# Patient Record
Sex: FEMALE | Race: WHITE | NOT HISPANIC OR LATINO | Employment: OTHER | URBAN - METROPOLITAN AREA
[De-identification: names, ages, dates, MRNs, and addresses within clinical notes are randomized per-mention and may not be internally consistent; named-entity substitution may affect disease eponyms.]

---

## 2017-01-03 ENCOUNTER — TELEPHONE (OUTPATIENT)
Dept: INTERNAL MEDICINE | Facility: CLINIC | Age: 77
End: 2017-01-03

## 2017-01-03 ENCOUNTER — RADIANT APPOINTMENT (OUTPATIENT)
Dept: MAMMOGRAPHY | Facility: CLINIC | Age: 77
End: 2017-01-03
Attending: INTERNAL MEDICINE
Payer: COMMERCIAL

## 2017-01-03 DIAGNOSIS — I10 ESSENTIAL HYPERTENSION WITH GOAL BLOOD PRESSURE LESS THAN 140/90: ICD-10-CM

## 2017-01-03 DIAGNOSIS — Z12.31 VISIT FOR SCREENING MAMMOGRAM: ICD-10-CM

## 2017-01-03 DIAGNOSIS — E78.5 HYPERLIPIDEMIA WITH TARGET LDL LESS THAN 100: ICD-10-CM

## 2017-01-03 LAB
ALBUMIN SERPL-MCNC: 3.5 G/DL (ref 3.4–5)
ALP SERPL-CCNC: 131 U/L (ref 40–150)
ALT SERPL W P-5'-P-CCNC: 31 U/L (ref 0–50)
ANION GAP SERPL CALCULATED.3IONS-SCNC: 8 MMOL/L (ref 3–14)
AST SERPL W P-5'-P-CCNC: 24 U/L (ref 0–45)
BILIRUB SERPL-MCNC: 0.6 MG/DL (ref 0.2–1.3)
BUN SERPL-MCNC: 16 MG/DL (ref 7–30)
CALCIUM SERPL-MCNC: 9.6 MG/DL (ref 8.5–10.1)
CHLORIDE SERPL-SCNC: 107 MMOL/L (ref 94–109)
CHOLEST SERPL-MCNC: 145 MG/DL
CO2 SERPL-SCNC: 28 MMOL/L (ref 20–32)
CREAT SERPL-MCNC: 0.98 MG/DL (ref 0.52–1.04)
GFR SERPL CREATININE-BSD FRML MDRD: 55 ML/MIN/1.7M2
GLUCOSE SERPL-MCNC: 94 MG/DL (ref 70–99)
HDLC SERPL-MCNC: 43 MG/DL
LDLC SERPL CALC-MCNC: 67 MG/DL
NONHDLC SERPL-MCNC: 102 MG/DL
POTASSIUM SERPL-SCNC: 3.6 MMOL/L (ref 3.4–5.3)
PROT SERPL-MCNC: 7 G/DL (ref 6.8–8.8)
SODIUM SERPL-SCNC: 143 MMOL/L (ref 133–144)
TRIGL SERPL-MCNC: 173 MG/DL

## 2017-01-03 PROCEDURE — G0202 SCR MAMMO BI INCL CAD: HCPCS | Mod: TC

## 2017-01-03 PROCEDURE — 80061 LIPID PANEL: CPT | Performed by: INTERNAL MEDICINE

## 2017-01-03 PROCEDURE — 36415 COLL VENOUS BLD VENIPUNCTURE: CPT | Performed by: INTERNAL MEDICINE

## 2017-01-03 PROCEDURE — 80053 COMPREHEN METABOLIC PANEL: CPT | Performed by: INTERNAL MEDICINE

## 2017-01-06 ENCOUNTER — HOSPITAL ENCOUNTER (OUTPATIENT)
Dept: MAMMOGRAPHY | Facility: CLINIC | Age: 77
End: 2017-01-06
Attending: INTERNAL MEDICINE
Payer: MEDICARE

## 2017-01-06 ENCOUNTER — HOSPITAL ENCOUNTER (OUTPATIENT)
Dept: MAMMOGRAPHY | Facility: CLINIC | Age: 77
Discharge: HOME | End: 2017-01-06
Attending: INTERNAL MEDICINE | Admitting: INTERNAL MEDICINE
Payer: MEDICARE

## 2017-01-06 DIAGNOSIS — R92.8 ABNORMAL MAMMOGRAM: ICD-10-CM

## 2017-01-06 PROCEDURE — G0206 DX MAMMO INCL CAD UNI: HCPCS | Mod: RT

## 2017-01-06 PROCEDURE — 76642 ULTRASOUND BREAST LIMITED: CPT | Mod: RT

## 2017-01-12 ENCOUNTER — TELEPHONE (OUTPATIENT)
Dept: INTERNAL MEDICINE | Facility: CLINIC | Age: 77
End: 2017-01-12

## 2017-01-12 NOTE — TELEPHONE ENCOUNTER
Pt will make her own appt for a biopsy to be done.  She is out of town for a while. Wanted clinic to know not to schedule for her.  Pt had a mammo and it was discussed she needs a biopsy and there isn't a big hurry to get it done.

## 2017-02-02 DIAGNOSIS — E78.5 HYPERLIPIDEMIA WITH TARGET LDL LESS THAN 100: Primary | ICD-10-CM

## 2017-02-02 RX ORDER — SIMVASTATIN 40 MG
40 TABLET ORAL AT BEDTIME
Qty: 90 TABLET | Refills: 2 | Status: SHIPPED | OUTPATIENT
Start: 2017-02-02 | End: 2017-12-27

## 2017-02-02 NOTE — TELEPHONE ENCOUNTER
simvastatin     Last Written Prescription Date: 01/05/16  Last Fill Quantity: 90, # refills: 3  Last Office Visit with [facility], [facility] or [facility] prescribing provider: 11/08/16       CHOL      145   1/3/2017  HDL       43   1/3/2017  LDL       67   1/3/2017  TRIG      173   1/3/2017  CHOLHDLRATIO      3.9   11/3/2014

## 2017-03-06 ENCOUNTER — TELEPHONE (OUTPATIENT)
Dept: FAMILY MEDICINE | Facility: CLINIC | Age: 77
End: 2017-03-06

## 2017-03-06 NOTE — TELEPHONE ENCOUNTER
I would suggest highly to patient that she get this biopsied for reassurance.        IMPRESSION: BI-RADS CATEGORY: 4 - Suspicious Abnormality-Biopsy Should  Be Considered. Right breast 10:30 o'clock calcifications.     RECOMMENDED FOLLOW-UP: Biopsy.  Histology using stereotactically guided core needle biopsy with clip  placement is recommended.

## 2017-03-06 NOTE — TELEPHONE ENCOUNTER
Note received from [facility] stating they have mailed the patient 2 letters to schedule breast biopsy and nothing has been scheduled. Patient contacted and patient states he is declining breast biopsy at this point as she was told by radiologist abnormalities were most likely calcium deposits in breast. States she would like to follow up in 1 yr for recheck.

## 2017-12-27 DIAGNOSIS — E78.5 HYPERLIPIDEMIA WITH TARGET LDL LESS THAN 100: ICD-10-CM

## 2017-12-27 DIAGNOSIS — I10 ESSENTIAL HYPERTENSION WITH GOAL BLOOD PRESSURE LESS THAN 140/90: ICD-10-CM

## 2017-12-27 RX ORDER — AMLODIPINE BESYLATE 10 MG/1
10 TABLET ORAL DAILY
Qty: 30 TABLET | Refills: 0 | Status: SHIPPED | OUTPATIENT
Start: 2017-12-27 | End: 2018-01-05

## 2017-12-27 RX ORDER — SIMVASTATIN 40 MG
40 TABLET ORAL AT BEDTIME
Qty: 30 TABLET | Refills: 0 | Status: SHIPPED | OUTPATIENT
Start: 2017-12-27 | End: 2018-01-05

## 2017-12-27 RX ORDER — SIMVASTATIN 40 MG
TABLET ORAL
Qty: 90 TABLET | Refills: 0 | OUTPATIENT
Start: 2017-12-27

## 2017-12-27 NOTE — LETTER
[facility]  [address] [id number] [phone]            Alison Dash  [address] [facility] [address]        December 27, 2017    Dear Alison,    While refilling your prescription today, we noticed that you are due for an appointment with your provider.  We will refill your prescription for 30 days, but a follow-up appointment must be made before any additional refills can be approved.     Taking care of your health is important to us and we look forward to seeing you in the near future.  Please call us at [phone] or [phone] (or use [employer]) to schedule an appointment.     Please disregard this notice if you have already made an appointment.    Sincerely,        [facility]

## 2017-12-27 NOTE — TELEPHONE ENCOUNTER
Medication is being filled for 1 time refill only due to:  Patient needs to be seen because it has been more than one year since last visit.   Letter sent

## 2018-01-05 NOTE — PROGRESS NOTES
SUBJECTIVE:   Alison Dash is a 77 year old female who presents to clinic today for the following health issues:    Hyperlipidemia Follow-Up      Rate your low fat/cholesterol diet?: fair    Taking statin?  Yes, no muscle aches from statin    Other lipid medications/supplements?:  Fish oil/Omega 3, dose 2000 mg without side effects    Hypertension Follow-up      Outpatient blood pressures are not being checked.    Low Salt Diet: no added salt      Amount of exercise or physical activity: None- [place] around [place]     Problems taking medications regularly: No    Medication side effects: none  Diet: regular (no restrictions)    Other concerns:  1. Holding Zantac with good results     Problem list and histories reviewed & adjusted, as indicated.  Additional history: as documented    Patient Active Problem List   Diagnosis     Esophageal reflux     Tobacco use disorder     Low back pain     Hyperlipidemia with target LDL less than 100     CKD (chronic kidney disease) stage 3, GFR 30-59 ml/min     Advanced directives, counseling/discussion     Colonoscopy refused     Essential hypertension with goal blood pressure less than 140/90     Past Surgical History:   Procedure Laterality Date     NO HISTORY OF SURGERY         Social History   Substance Use Topics     Smoking status: Former Smoker     Packs/day: 0.50     Quit date: 4/1/2010     Smokeless tobacco: Never Used     Alcohol use Yes      Comment: SOCIAL     Family History   Problem Relation Age of Onset     C.A.D. Mother          Current Outpatient Prescriptions   Medication Sig Dispense Refill     amLODIPine (NORVASC) 10 MG tablet Take 1 tablet (10 mg) by mouth daily 30 tablet 0     simvastatin (ZOCOR) 40 MG tablet Take 1 tablet (40 mg) by mouth At Bedtime 30 tablet 0     aspirin 325 MG EC tablet Take 1 tablet by mouth daily.       Multiple Minerals-Vitamins (CALCIUM CITRATE +) TABS Take  by mouth. 630 mg daily       ARB NOT PRESCRIBED,  "INTENTIONAL, by Other route continuous prn.  0     Multiple Vitamins-Minerals (PRESERVISION/LUTEIN) CAPS Take 1 Can by mouth 2 times daily.       fish oil-omega-3 fatty acids 1000 MG capsule Take 2 capsules by mouth daily. 180 capsule 12     ZANTAC 75 75 MG OR TABS 1 QD       Allergies   Allergen Reactions     Lisinopril      Cough       BP Readings from Last 3 Encounters:   01/08/18 128/78   11/08/16 122/74   01/05/16 124/68    Wt Readings from Last 3 Encounters:   01/08/18 172 lb 8 oz (78.2 kg)   11/08/16 169 lb 14.4 oz (77.1 kg)   01/05/16 164 lb 8 oz (74.6 kg)            Reviewed and updated as needed this visit by clinical staff     Reviewed and updated as needed this visit by Provider         ROS:  C: NEGATIVE for fever, chills, change in weight  E/M: NEGATIVE for ear, mouth and throat problems  R: NEGATIVE for significant cough or SOB  CV: NEGATIVE for chest pain, palpitations or peripheral edema  GI: NEGATIVE for nausea, abdominal pain, heartburn, or change in bowel habits  : NEGATIVE for frequency, dysuria, or hematuria  M: NEGATIVE for significant arthralgias or myalgia  H: NEGATIVE for bleeding problems  P: NEGATIVE for changes in mood or affect    OBJECTIVE:                                                    /78  Pulse 87  Temp 98  F (36.7  C) (Oral)  Ht 5' 4\" (1.626 m)  Wt 172 lb 8 oz (78.2 kg)  SpO2 97%  BMI 29.61 kg/m2  Body mass index is 29.61 kg/(m^2).  GENERAL: healthy, alert and no distress  EYES: Eyes grossly normal to inspection, extraocular movements - intact, and PERRL  HENT: ear canals- normal; TMs- normal; Nose- normal; Mouth- no ulcers, no lesions  NECK: no tenderness, no adenopathy, no asymmetry, no masses, no stiffness; thyroid- normal to palpation  RESP: lungs clear to auscultation - no rales, no rhonchi, no wheezes  CV: regular rates and rhythm, normal S1 S2, no S3 or S4 and no murmur, no click or rub -  ABDOMEN: soft, no tenderness, no  hepatosplenomegaly, no masses, " normal bowel sounds  MS: extremities- no gross deformities noted, no edema  NEURO: strength and tone- normal, sensory exam- grossly normal, mentation- intact, speech- normal, reflexes- symmetric  PSYCH: Alert and oriented times 3; speech- coherent , normal rate and volume; able to articulate logical thoughts, able to abstract reason, no tangential thoughts, no hallucinations or delusions, affect- normal       ASSESSMENT/PLAN:                                                    (I10) Essential hypertension with goal blood pressure less than 140/90  (primary encounter diagnosis)  Comment: Able on current medical therapy we will make no changes in her current dosing  Plan: amLODIPine (NORVASC) 10 MG tablet,         Comprehensive metabolic panel        Blood pressure check is been recommended again in 6 months    (E78.5) Hyperlipidemia with target LDL less than 100  Comment: Repeat fasting labs have been ordered refills have been sent to her pharmacy  Plan: simvastatin (ZOCOR) 40 MG tablet, Comprehensive        metabolic panel, Lipid Profile          (N64.59) Abnormal breast finding  Comment: I had an extensive discussion with the patient in regards to her diagnostic mammogram and ultrasound that were performed in January of last year.  She informs me that she was told that there was nothing of significance.  Upon review of her ultrasound it reports a category 4 suspicious abnormality to the point of where a biopsy was recommended and scheduled on 1/12/2017 but the patient did not follow through.  Upon discussion it is noted that the patient was aware that a biopsy was recommended.  Discussed with patient the importance of following through with this based on the potential risk for breast malignancy but she is not interested in pursuing this.  I tried do understand her thought process as to why she is not interested in pursuing this and she feels that this is not significantly different from prior issues she has had and  does not want to pursue this at the present time.  Discussed with her my reservations about this and again suggested that she get this evaluated.  She will consider  Plan:     (N18.3) CKD (chronic kidney disease) stage 3, GFR 30-59 ml/min  Comment: Repeat complete metabolic panel as ordered.  Plan:     (F17.200) Tobacco use disorder  Comment: Continued smoking cessation was advised  Plan:     (Z71.89) Advanced directives, counseling/discussion  Comment:   Plan:     Advised patient that she should also obtain a colonoscopy as routine screening but she is also refused this    See Patient Instructions    Vince Beckett MD  [facility]    25 minutes spent with this patient, face to face, discussing treatment options for listed problems above as well as side effects of appropriate medications.  Counseling time extended beyond 50% of the clinic visit.  Medication dosing, treatment plan and follow-up were discussed. Also reviewed need for primary care testing for patient.

## 2018-01-08 ENCOUNTER — OFFICE VISIT (OUTPATIENT)
Dept: INTERNAL MEDICINE | Facility: CLINIC | Age: 78
End: 2018-01-08
Payer: COMMERCIAL

## 2018-01-08 VITALS
SYSTOLIC BLOOD PRESSURE: 128 MMHG | TEMPERATURE: 98 F | DIASTOLIC BLOOD PRESSURE: 78 MMHG | HEIGHT: 64 IN | BODY MASS INDEX: 29.45 KG/M2 | OXYGEN SATURATION: 97 % | WEIGHT: 172.5 LBS | HEART RATE: 87 BPM

## 2018-01-08 DIAGNOSIS — F17.200 TOBACCO USE DISORDER: ICD-10-CM

## 2018-01-08 DIAGNOSIS — E78.5 HYPERLIPIDEMIA WITH TARGET LDL LESS THAN 100: ICD-10-CM

## 2018-01-08 DIAGNOSIS — I10 ESSENTIAL HYPERTENSION WITH GOAL BLOOD PRESSURE LESS THAN 140/90: Primary | ICD-10-CM

## 2018-01-08 DIAGNOSIS — N64.59 ABNORMAL BREAST FINDING: ICD-10-CM

## 2018-01-08 DIAGNOSIS — K21.9 GASTROESOPHAGEAL REFLUX DISEASE WITHOUT ESOPHAGITIS: ICD-10-CM

## 2018-01-08 DIAGNOSIS — N18.30 CKD (CHRONIC KIDNEY DISEASE) STAGE 3, GFR 30-59 ML/MIN (H): ICD-10-CM

## 2018-01-08 DIAGNOSIS — Z71.89 ADVANCED DIRECTIVES, COUNSELING/DISCUSSION: ICD-10-CM

## 2018-01-08 LAB
ALBUMIN SERPL-MCNC: 3.3 G/DL (ref 3.4–5)
ALP SERPL-CCNC: 125 U/L (ref 40–150)
ALT SERPL W P-5'-P-CCNC: 22 U/L (ref 0–50)
ANION GAP SERPL CALCULATED.3IONS-SCNC: 7 MMOL/L (ref 3–14)
AST SERPL W P-5'-P-CCNC: 18 U/L (ref 0–45)
BILIRUB SERPL-MCNC: 0.6 MG/DL (ref 0.2–1.3)
BUN SERPL-MCNC: 20 MG/DL (ref 7–30)
CALCIUM SERPL-MCNC: 9.2 MG/DL (ref 8.5–10.1)
CHLORIDE SERPL-SCNC: 109 MMOL/L (ref 94–109)
CHOLEST SERPL-MCNC: 141 MG/DL
CO2 SERPL-SCNC: 26 MMOL/L (ref 20–32)
CREAT SERPL-MCNC: 1.01 MG/DL (ref 0.52–1.04)
GFR SERPL CREATININE-BSD FRML MDRD: 53 ML/MIN/1.7M2
GLUCOSE SERPL-MCNC: 102 MG/DL (ref 70–99)
HDLC SERPL-MCNC: 42 MG/DL
LDLC SERPL CALC-MCNC: 66 MG/DL
NONHDLC SERPL-MCNC: 99 MG/DL
POTASSIUM SERPL-SCNC: 4 MMOL/L (ref 3.4–5.3)
PROT SERPL-MCNC: 6.7 G/DL (ref 6.8–8.8)
SODIUM SERPL-SCNC: 142 MMOL/L (ref 133–144)
TRIGL SERPL-MCNC: 163 MG/DL

## 2018-01-08 PROCEDURE — 80053 COMPREHEN METABOLIC PANEL: CPT | Performed by: INTERNAL MEDICINE

## 2018-01-08 PROCEDURE — 99214 OFFICE O/P EST MOD 30 MIN: CPT | Performed by: INTERNAL MEDICINE

## 2018-01-08 PROCEDURE — 80061 LIPID PANEL: CPT | Performed by: INTERNAL MEDICINE

## 2018-01-08 PROCEDURE — 36415 COLL VENOUS BLD VENIPUNCTURE: CPT | Performed by: INTERNAL MEDICINE

## 2018-01-08 RX ORDER — SIMVASTATIN 40 MG
40 TABLET ORAL AT BEDTIME
Qty: 90 TABLET | Refills: 3 | Status: SHIPPED | OUTPATIENT
Start: 2018-01-08 | End: 2019-01-17

## 2018-01-08 RX ORDER — AMLODIPINE BESYLATE 10 MG/1
10 TABLET ORAL DAILY
Qty: 90 TABLET | Refills: 3 | Status: SHIPPED | OUTPATIENT
Start: 2018-01-08 | End: 2019-01-17

## 2018-01-08 NOTE — NURSING NOTE
"Chief Complaint   Patient presents with     Recheck Medication       Initial /78  Pulse 87  Temp 98  F (36.7  C) (Oral)  Ht 5' 4\" (1.626 m)  Wt 172 lb 8 oz (78.2 kg)  SpO2 97%  BMI 29.61 kg/m2 Estimated body mass index is 29.61 kg/(m^2) as calculated from the following:    Height as of this encounter: 5' 4\" (1.626 m).    Weight as of this encounter: 172 lb 8 oz (78.2 kg).  Medication Reconciliation: complete   Teresa Gill, NEENA      "

## 2018-01-08 NOTE — LETTER
[facility]  [address] [address]  [phone]      1/8/2018       Alison Dash  [address] [facility] [address]        Dear Alison,    I am pleased to inform you that your routine blood work including your sodium, potassium, calcium, kidney and liver function tests are stable.    Your cholesterol also looks stable and I would not change anything at this point but would repeat your labs in 12 months.    Your blood sugar function test is just slightly abnormal and elevated and should be rechecked here in the clinic in 12 months with a follow-up visit with me, fasting. In the meantime, please work on your diet limiting your carbohydrates and getting some good, regular exercise as this will also help your triglyceride level.      Sincerely,      Vince Beckett MD  Internal Medicine

## 2018-01-08 NOTE — MR AVS SNAPSHOT
"              After Visit Summary   1/8/2018    Alison Dash    MRN: [id number]           Patient Information     Date Of Birth          1940        Visit Information        Provider Department      1/8/2018 7:20 AM Vince Beckett MD [facility]        Today's Diagnoses     Essential hypertension with goal blood pressure less than 140/90    -  1    Hyperlipidemia with target LDL less than 100        Abnormal breast finding        CKD (chronic kidney disease) stage 3, GFR 30-59 ml/min        Tobacco use disorder        Advanced directives, counseling/discussion        Gastroesophageal reflux disease without esophagitis           Follow-ups after your visit        Follow-up notes from your care team     Return if symptoms worsen or fail to improve.      Who to contact     If you have questions or need follow up information about today's clinic visit or your schedule please contact [facility][1] directly at [phone].  Normal or non-critical lab and imaging results will be communicated to you by MyChart, letter or phone within 4 business days after the clinic has received the results. If you do not hear from us within 7 days, please contact the clinic through MyChart or phone. If you have a critical or abnormal lab result, we will notify you by phone as soon as possible.  Submit refill requests through [employer] or call your pharmacy and they will forward the refill request to us. Please allow 3 business days for your refill to be completed.          Additional Information About Your Visit        MyChart Information     [employer] lets you send messages to your doctor, view your test results, renew your prescriptions, schedule appointments and more. To sign up, go to [email or web address] . Click on \"Log in\" on the left side of the screen, which will take you to the Welcome page. Then click on \"Sign up Now\" on the right side of the page.     You will be asked to " "enter the access code listed below, as well as some personal information. Please follow the directions to create your username and password.     Your access code is: [email or web address]  Expires: 2018  7:38 AM     Your access code will  in 90 days. If you need help or a new code, please call your [place] clinic or [phone].        Care EveryWhere ID     This is your Care EveryWhere ID. This could be used by other organizations to access your [place] medical records  [id number]        Your Vitals Were     Pulse Temperature Height Pulse Oximetry BMI (Body Mass Index)       87 98  F (36.7  C) (Oral) 5' 4\" (1.626 m) 97% 29.61 kg/m2        Blood Pressure from Last 3 Encounters:   18 128/78   16 122/74   16 124/68    Weight from Last 3 Encounters:   18 172 lb 8 oz (78.2 kg)   16 169 lb 14.4 oz (77.1 kg)   16 164 lb 8 oz (74.6 kg)              We Performed the Following     Comprehensive metabolic panel     Lipid Profile          Where to get your medicines      These medications were sent to [facility] [address] [place] [address] [address] [place] [address] [address] [facility] [address]    Hours:  24-hours Phone:  [phone]     amLODIPine 10 MG tablet    simvastatin 40 MG tablet          Primary Care Provider Office Phone # Fax #    Vince Beckett [phone]       [address] [place] [id number]        Equal Access to Services     [facility] AH: Hadii noe sotelo hadasho Somanish, waaxda luqadaha, qaybta kaalmada adecainyaesperanza, hina casanova . So [facility] [phone].    ATENCIÓN: Si habla español, tiene a lawler disposición servicios gratuitos de asistencia lingüística. Llame al [phone].    We comply with applicable federal civil rights laws and [place] laws. We do not discriminate on the basis of race, color, national origin, age, disability, sex, sexual orientation, or gender " identity.            Thank you!     Thank you for choosing [facility]  for your care. Our goal is always to provide you with excellent care. Hearing back from our patients is one way we can continue to improve our services. Please take a few minutes to complete the written survey that you may receive in the mail after your visit with us. Thank you!             Your Updated Medication List - Protect others around you: Learn how to safely use, store and throw away your medicines at www.disposemymeds.org.          This list is accurate as of: 1/8/18  7:38 AM.  Always use your most recent med list.                   Brand Name Dispense Instructions for use Diagnosis    amLODIPine 10 MG tablet    NORVASC    90 tablet    Take 1 tablet (10 mg) by mouth daily    Essential hypertension with goal blood pressure less than 140/90       ARB NOT PRESCRIBED (INTENTIONAL)      by Other route continuous prn.    CKD (chronic kidney disease) stage 3, GFR 30-59 ml/min       aspirin 325 MG EC tablet      Take 1 tablet by mouth daily.        calcium citrate + Tabs      Take  by mouth. 630 mg daily        fish oil-omega-3 fatty acids 1000 MG capsule     180 capsule    Take 2 capsules by mouth daily.        PRESERVISION/LUTEIN Caps      Take 1 Can by mouth 2 times daily.    HTN (hypertension)       ranitidine 75 MG tablet   Generic drug:  ranitidine      1 QD        simvastatin 40 MG tablet    ZOCOR    90 tablet    Take 1 tablet (40 mg) by mouth At Bedtime    Hyperlipidemia with target LDL less than 100

## 2018-12-18 ENCOUNTER — OFFICE VISIT (OUTPATIENT)
Dept: INTERNAL MEDICINE | Facility: CLINIC | Age: 78
End: 2018-12-18
Payer: COMMERCIAL

## 2018-12-18 VITALS
DIASTOLIC BLOOD PRESSURE: 65 MMHG | TEMPERATURE: 98.2 F | RESPIRATION RATE: 16 BRPM | WEIGHT: 168.7 LBS | BODY MASS INDEX: 28.96 KG/M2 | HEART RATE: 87 BPM | SYSTOLIC BLOOD PRESSURE: 100 MMHG | OXYGEN SATURATION: 96 %

## 2018-12-18 DIAGNOSIS — L98.9 SKIN LESION: ICD-10-CM

## 2018-12-18 DIAGNOSIS — E78.2 MIXED HYPERLIPIDEMIA: Primary | ICD-10-CM

## 2018-12-18 DIAGNOSIS — Z78.0 ASYMPTOMATIC POSTMENOPAUSAL STATUS: ICD-10-CM

## 2018-12-18 PROCEDURE — 17110 DESTRUCTION B9 LES UP TO 14: CPT | Performed by: PHYSICIAN ASSISTANT

## 2018-12-18 NOTE — PROGRESS NOTES
SUBJECTIVE:   Alison Dash is a 78 year old female who presents to clinic today for the following health issues:   Pt needs renewal on all medications for a year    Hyperlipidemia Follow-Up      Rate your low fat/cholesterol diet?: good    Taking statin?  Yes, no muscle aches from statin    Other lipid medications/supplements?:  none    Hypertension Follow-up      Outpatient blood pressures are being checked at home.  Results are 120's.    Low Salt Diet: no added salt      Amount of exercise or physical activity: 6-7 days/week for an average of 45-60 minutes    Problems taking medications regularly: No    Medication side effects: none    Diet: regular (no restrictions)        Pt has a sore on her nose has had it for about a year. Forms a scab and then it will fall off and come back.     Problem list and histories reviewed & adjusted, as indicated.  Additional history: Patient notes that she was treated for Lyme's disease again earlier this year.  Did review prevention.  Patient was unaware that they do make insecticide clothing and that she may use repellent granules on some of her property.  She is not fasting.       ROS:  Constitutional, HEENT, cardiovascular, pulmonary, gi and gu systems are negative, except as otherwise noted.    OBJECTIVE:     /65   Pulse 87   Temp 98.2  F (36.8  C) (Oral)   Resp 16   Wt 76.5 kg (168 lb 11.2 oz)   SpO2 96%   BMI 28.96 kg/m    Body mass index is 28.96 kg/m .  GENERAL: healthy, alert and no distress  NECK: no adenopathy, no asymmetry, masses, or scars and thyroid normal to palpation  RESP: lungs clear to auscultation - no rales, rhonchi or wheezes  CV: regular rate and rhythm, normal S1 S2, no S3 or S4, no murmur, click or rub, no peripheral edema and peripheral pulses strong  SKIN: 0.3 cm crusted lesion on R lateral nose.  Site prepped with ETOH and cryotherapy performed with liquid nitrogen with the achievement of ice ball with 2mm margins x 2 cycles.  Patient  tolerated this well and after care reviewed.       ASSESSMENT/PLAN:   1. Mixed hyperlipidemia  - BASIC METABOLIC PANEL; Future  - Lipid panel reflex to direct LDL Fasting; Future    2. Skin lesion  Site care as reviewed.   3. Asymptomatic postmenopausal status  - DEXA HIP/PELVIS/SPINE - Future; Future    Follow up on labs  Refill and/or adjust medications as warranted.  Follow up in 3-4 weeks for recheck.  Patient amenable to this follow up plan.      Sánchez Feng PA-C  [facility]

## 2018-12-19 DIAGNOSIS — E78.2 MIXED HYPERLIPIDEMIA: ICD-10-CM

## 2018-12-19 LAB
ANION GAP SERPL CALCULATED.3IONS-SCNC: 6 MMOL/L (ref 3–14)
BUN SERPL-MCNC: 18 MG/DL (ref 7–30)
CALCIUM SERPL-MCNC: 9.5 MG/DL (ref 8.5–10.1)
CHLORIDE SERPL-SCNC: 109 MMOL/L (ref 94–109)
CHOLEST SERPL-MCNC: 137 MG/DL
CO2 SERPL-SCNC: 24 MMOL/L (ref 20–32)
CREAT SERPL-MCNC: 0.87 MG/DL (ref 0.52–1.04)
GFR SERPL CREATININE-BSD FRML MDRD: 64 ML/MIN/{1.73_M2}
GLUCOSE SERPL-MCNC: 109 MG/DL (ref 70–99)
HDLC SERPL-MCNC: 39 MG/DL
LDLC SERPL CALC-MCNC: 64 MG/DL
NONHDLC SERPL-MCNC: 98 MG/DL
POTASSIUM SERPL-SCNC: 3.9 MMOL/L (ref 3.4–5.3)
SODIUM SERPL-SCNC: 139 MMOL/L (ref 133–144)
TRIGL SERPL-MCNC: 172 MG/DL

## 2018-12-19 PROCEDURE — 36415 COLL VENOUS BLD VENIPUNCTURE: CPT | Performed by: PHYSICIAN ASSISTANT

## 2018-12-19 PROCEDURE — 80048 BASIC METABOLIC PNL TOTAL CA: CPT | Performed by: PHYSICIAN ASSISTANT

## 2018-12-19 PROCEDURE — 80061 LIPID PANEL: CPT | Performed by: PHYSICIAN ASSISTANT

## 2018-12-20 DIAGNOSIS — R73.9 HYPERGLYCEMIA: Primary | ICD-10-CM

## 2019-01-17 ENCOUNTER — OFFICE VISIT (OUTPATIENT)
Dept: INTERNAL MEDICINE | Facility: CLINIC | Age: 79
End: 2019-01-17
Payer: COMMERCIAL

## 2019-01-17 VITALS
RESPIRATION RATE: 16 BRPM | OXYGEN SATURATION: 95 % | BODY MASS INDEX: 28.51 KG/M2 | HEIGHT: 64 IN | HEART RATE: 88 BPM | WEIGHT: 167 LBS | TEMPERATURE: 97.7 F | SYSTOLIC BLOOD PRESSURE: 124 MMHG | DIASTOLIC BLOOD PRESSURE: 76 MMHG

## 2019-01-17 DIAGNOSIS — E78.5 HYPERLIPIDEMIA WITH TARGET LDL LESS THAN 100: ICD-10-CM

## 2019-01-17 DIAGNOSIS — Z78.0 ASYMPTOMATIC POSTMENOPAUSAL STATUS: ICD-10-CM

## 2019-01-17 DIAGNOSIS — I10 ESSENTIAL HYPERTENSION WITH GOAL BLOOD PRESSURE LESS THAN 140/90: ICD-10-CM

## 2019-01-17 DIAGNOSIS — R73.9 HYPERGLYCEMIA: ICD-10-CM

## 2019-01-17 DIAGNOSIS — L98.9 SKIN LESION: Primary | ICD-10-CM

## 2019-01-17 LAB — HBA1C MFR BLD: 5.6 % (ref 0–5.6)

## 2019-01-17 PROCEDURE — 17110 DESTRUCTION B9 LES UP TO 14: CPT | Performed by: PHYSICIAN ASSISTANT

## 2019-01-17 PROCEDURE — 99213 OFFICE O/P EST LOW 20 MIN: CPT | Mod: 25 | Performed by: PHYSICIAN ASSISTANT

## 2019-01-17 PROCEDURE — 36415 COLL VENOUS BLD VENIPUNCTURE: CPT | Performed by: PHYSICIAN ASSISTANT

## 2019-01-17 PROCEDURE — 83036 HEMOGLOBIN GLYCOSYLATED A1C: CPT | Performed by: PHYSICIAN ASSISTANT

## 2019-01-17 RX ORDER — SIMVASTATIN 40 MG
40 TABLET ORAL AT BEDTIME
Qty: 90 TABLET | Refills: 3 | Status: SHIPPED | OUTPATIENT
Start: 2019-01-17 | End: 2019-12-31

## 2019-01-17 RX ORDER — AMLODIPINE BESYLATE 10 MG/1
10 TABLET ORAL DAILY
Qty: 90 TABLET | Refills: 3 | Status: SHIPPED | OUTPATIENT
Start: 2019-01-17 | End: 2019-12-31

## 2019-01-17 ASSESSMENT — MIFFLIN-ST. JEOR: SCORE: 1222.51

## 2019-01-17 NOTE — PROGRESS NOTES
"  SUBJECTIVE:   Alison Dash is a 78 year old female who presents to clinic today for the following health issues:    1 month follow up for nose - unsure if better.   Needs med refills  Lab results    Problem list and histories reviewed & adjusted, as indicated.  Additional history: Patient presents for repeat freezing of a skin lesion of the nose.     Reviewed and updated as needed this visit by clinical staff  Tobacco  Allergies  Meds  Med Hx  Surg Hx  Fam Hx  Soc Hx      ROS:  Constitutional, HEENT, cardiovascular, pulmonary, gi and gu systems are negative, except as otherwise noted.    OBJECTIVE:     /76   Pulse 88   Temp 97.7  F (36.5  C) (Oral)   Resp 16   Ht 1.626 m (5' 4\")   Wt 75.8 kg (167 lb)   SpO2 95%   Breastfeeding? No   BMI 28.67 kg/m    Body mass index is 28.67 kg/m .    Procedure Note: Site prepped with ETOH and 2 freeze thaw cycles with liquid nitrogen applied to the R lateral nose to good effect with 3-4 mm margins.      Diagnostic Test Results:  none     ASSESSMENT/PLAN:   1. Skin lesion    2. Asymptomatic postmenopausal status    3. Hyperlipidemia with target LDL less than 100  - simvastatin (ZOCOR) 40 MG tablet; Take 1 tablet (40 mg) by mouth At Bedtime  Dispense: 90 tablet; Refill: 3    4. Essential hypertension with goal blood pressure less than 140/90  - amLODIPine (NORVASC) 10 MG tablet; Take 1 tablet (10 mg) by mouth daily  Dispense: 90 tablet; Refill: 3    Site care as reviewed.  Follow up in 2-4 weeks.  Patient amenable to this follow up plan.     Sánchez Feng PA-C  [facility]  "

## 2019-02-14 ENCOUNTER — OFFICE VISIT (OUTPATIENT)
Dept: INTERNAL MEDICINE | Facility: CLINIC | Age: 79
End: 2019-02-14
Payer: COMMERCIAL

## 2019-02-14 VITALS
HEIGHT: 64 IN | RESPIRATION RATE: 15 BRPM | HEART RATE: 87 BPM | OXYGEN SATURATION: 98 % | SYSTOLIC BLOOD PRESSURE: 130 MMHG | BODY MASS INDEX: 29.19 KG/M2 | TEMPERATURE: 97.7 F | WEIGHT: 171 LBS | DIASTOLIC BLOOD PRESSURE: 72 MMHG

## 2019-02-14 DIAGNOSIS — Z78.0 ASYMPTOMATIC POSTMENOPAUSAL STATUS: ICD-10-CM

## 2019-02-14 DIAGNOSIS — L98.9 SKIN LESION: Primary | ICD-10-CM

## 2019-02-14 PROCEDURE — 99213 OFFICE O/P EST LOW 20 MIN: CPT | Performed by: INTERNAL MEDICINE

## 2019-02-14 ASSESSMENT — MIFFLIN-ST. JEOR: SCORE: 1240.65

## 2019-02-14 NOTE — PROGRESS NOTES
SUBJECTIVE:   Alison Dash is a 78 year old female who presents to clinic today for the following health issues:    Follow up on skin lesion on R side of nose from 18. Patient believes it has improved but wants to be sure.  She was seen recently and had an area to the right side of her nose frozen.  She feels it has improved but is just here for a double check.    Problem list and histories reviewed & adjusted, as indicated.  Additional history: as documented    Patient Active Problem List   Diagnosis     Tobacco use disorder     Low back pain     Hyperlipidemia with target LDL less than 100     CKD (chronic kidney disease) stage 3, GFR 30-59 ml/min (H)     Advanced directives, counseling/discussion     Colonoscopy refused     Essential hypertension with goal blood pressure less than 140/90     Gastroesophageal reflux disease without esophagitis     Past Surgical History:   Procedure Laterality Date     NO HISTORY OF SURGERY         Social History     Tobacco Use     Smoking status: Former Smoker     Packs/day: 0.50     Last attempt to quit: 2010     Years since quittin.8     Smokeless tobacco: Never Used   Substance Use Topics     Alcohol use: Yes     Comment: SOCIAL     Family History   Problem Relation Age of Onset     C.A.D. Mother          Current Outpatient Medications   Medication Sig Dispense Refill     amLODIPine (NORVASC) 10 MG tablet Take 1 tablet (10 mg) by mouth daily 90 tablet 3     ARB NOT PRESCRIBED, INTENTIONAL, by Other route continuous prn.  0     aspirin 325 MG EC tablet Take 1 tablet by mouth daily.       fish oil-omega-3 fatty acids 1000 MG capsule Take 2 capsules by mouth daily. 180 capsule 12     Multiple Minerals-Vitamins (CALCIUM CITRATE +) TABS Take  by mouth. 630 mg daily       Multiple Vitamins-Minerals (PRESERVISION/LUTEIN) CAPS Take 1 Can by mouth 2 times daily.       simvastatin (ZOCOR) 40 MG tablet Take 1 tablet (40 mg) by mouth At Bedtime 90 tablet 3     ZANTAC 75  "75 MG OR TABS 1 QD       Allergies   Allergen Reactions     Lisinopril      Cough       BP Readings from Last 3 Encounters:   02/14/19 130/72   01/17/19 124/76   12/18/18 100/65    Wt Readings from Last 3 Encounters:   02/14/19 77.6 kg (171 lb)   01/17/19 75.8 kg (167 lb)   12/18/18 76.5 kg (168 lb 11.2 oz)                    Reviewed and updated as needed this visit by clinical staff  Meds       Reviewed and updated as needed this visit by Provider         ROS:  CONSTITUTIONAL: NEGATIVE for fever, chills, change in weight  ENT/MOUTH: NEGATIVE for ear, mouth and throat problems  RESP: NEGATIVE for significant cough or SOB  CV: NEGATIVE for chest pain, palpitations or peripheral edema  GI: NEGATIVE for nausea, abdominal pain, heartburn, or change in bowel habits  : NEGATIVE for frequency, dysuria, or hematuria  MUSCULOSKELETAL: NEGATIVE for significant arthralgias or myalgia  NEURO: NEGATIVE for weakness, dizziness or paresthesias  HEME: NEGATIVE for bleeding problems  PSYCHIATRIC: NEGATIVE for changes in mood or affect    OBJECTIVE:                                                    /72   Pulse 87   Temp 97.7  F (36.5  C) (Oral)   Resp 15   Ht 1.626 m (5' 4\")   Wt 77.6 kg (171 lb)   SpO2 98%   BMI 29.35 kg/m    Body mass index is 29.35 kg/m .  GENERAL: healthy, alert and no distress  SKIN: There is a well-healed area to the right nare that appears without superficial change.  PSYCH: Alert and oriented times 3; speech- coherent , normal rate and volume; able to articulate logical thoughts, able to abstract reason, no tangential thoughts, no hallucinations or delusions, affect- normal       ASSESSMENT/PLAN:                                                      (L98.9) Skin lesion  (primary encounter diagnosis)  Comment: Stable and advised patient to watch for recurrence and consider dermatology follow-up if so  Plan:     (Z78.0) Asymptomatic postmenopausal status  Comment: Advised bone density scan and " patient has declined  Plan:     Advised colonoscopy, mammogram and routine screening but patient has declined  See Patient Instructions    Vince Beckett MD  [facility]

## 2019-12-31 ENCOUNTER — OFFICE VISIT (OUTPATIENT)
Dept: INTERNAL MEDICINE | Facility: CLINIC | Age: 79
End: 2019-12-31
Payer: COMMERCIAL

## 2019-12-31 VITALS
DIASTOLIC BLOOD PRESSURE: 78 MMHG | OXYGEN SATURATION: 98 % | TEMPERATURE: 97.6 F | SYSTOLIC BLOOD PRESSURE: 132 MMHG | HEIGHT: 64 IN | WEIGHT: 169.2 LBS | BODY MASS INDEX: 28.89 KG/M2 | RESPIRATION RATE: 15 BRPM | HEART RATE: 77 BPM

## 2019-12-31 DIAGNOSIS — K21.9 GASTROESOPHAGEAL REFLUX DISEASE WITHOUT ESOPHAGITIS: ICD-10-CM

## 2019-12-31 DIAGNOSIS — I10 ESSENTIAL HYPERTENSION WITH GOAL BLOOD PRESSURE LESS THAN 140/90: Primary | ICD-10-CM

## 2019-12-31 DIAGNOSIS — E78.5 HYPERLIPIDEMIA WITH TARGET LDL LESS THAN 100: ICD-10-CM

## 2019-12-31 DIAGNOSIS — Z78.0 ASYMPTOMATIC POSTMENOPAUSAL STATUS: ICD-10-CM

## 2019-12-31 DIAGNOSIS — Z12.31 VISIT FOR SCREENING MAMMOGRAM: ICD-10-CM

## 2019-12-31 DIAGNOSIS — N18.30 CKD (CHRONIC KIDNEY DISEASE) STAGE 3, GFR 30-59 ML/MIN (H): ICD-10-CM

## 2019-12-31 LAB
ALBUMIN SERPL-MCNC: 3.2 G/DL (ref 3.4–5)
ALP SERPL-CCNC: 124 U/L (ref 40–150)
ALT SERPL W P-5'-P-CCNC: 27 U/L (ref 0–50)
ANION GAP SERPL CALCULATED.3IONS-SCNC: 3 MMOL/L (ref 3–14)
AST SERPL W P-5'-P-CCNC: 22 U/L (ref 0–45)
BILIRUB SERPL-MCNC: 0.6 MG/DL (ref 0.2–1.3)
BUN SERPL-MCNC: 15 MG/DL (ref 7–30)
CALCIUM SERPL-MCNC: 9.6 MG/DL (ref 8.5–10.1)
CHLORIDE SERPL-SCNC: 110 MMOL/L (ref 94–109)
CHOLEST SERPL-MCNC: 138 MG/DL
CO2 SERPL-SCNC: 27 MMOL/L (ref 20–32)
CREAT SERPL-MCNC: 0.85 MG/DL (ref 0.52–1.04)
GFR SERPL CREATININE-BSD FRML MDRD: 65 ML/MIN/{1.73_M2}
GLUCOSE SERPL-MCNC: 94 MG/DL (ref 70–99)
HDLC SERPL-MCNC: 44 MG/DL
LDLC SERPL CALC-MCNC: 59 MG/DL
NONHDLC SERPL-MCNC: 94 MG/DL
POTASSIUM SERPL-SCNC: 3.9 MMOL/L (ref 3.4–5.3)
PROT SERPL-MCNC: 6.4 G/DL (ref 6.8–8.8)
SODIUM SERPL-SCNC: 140 MMOL/L (ref 133–144)
TRIGL SERPL-MCNC: 177 MG/DL

## 2019-12-31 PROCEDURE — 80061 LIPID PANEL: CPT | Performed by: INTERNAL MEDICINE

## 2019-12-31 PROCEDURE — 99207 C PAF COMPLETED  NO CHARGE: CPT | Performed by: INTERNAL MEDICINE

## 2019-12-31 PROCEDURE — 36415 COLL VENOUS BLD VENIPUNCTURE: CPT | Performed by: INTERNAL MEDICINE

## 2019-12-31 PROCEDURE — 80053 COMPREHEN METABOLIC PANEL: CPT | Performed by: INTERNAL MEDICINE

## 2019-12-31 PROCEDURE — 99214 OFFICE O/P EST MOD 30 MIN: CPT | Performed by: INTERNAL MEDICINE

## 2019-12-31 RX ORDER — SIMVASTATIN 40 MG
40 TABLET ORAL AT BEDTIME
Qty: 90 TABLET | Refills: 3 | Status: SHIPPED | OUTPATIENT
Start: 2019-12-31 | End: 2020-12-22

## 2019-12-31 RX ORDER — AMLODIPINE BESYLATE 10 MG/1
10 TABLET ORAL DAILY
Qty: 90 TABLET | Refills: 3 | Status: SHIPPED | OUTPATIENT
Start: 2019-12-31 | End: 2020-12-22

## 2019-12-31 ASSESSMENT — MIFFLIN-ST. JEOR: SCORE: 1227.49

## 2019-12-31 NOTE — LETTER
[facility]  [address] [address]  [phone]      12/31/2019       Alison Dash  [address] [facility] [facility] [place] [address]        Dear Alison,    I am pleased to inform you that your routine blood work including your sodium, potassium, calcium, kidney and liver function tests are all stable.    Your cholesterol also looks stable and I would not change anything at this point but would repeat your labs in 12 months.    This is a reminder if you are still taking ranitidine or Zantac you may want to consider an alternative therapy based on some of the restrictions that have been noted with that class of medications.  You could consider switching to Pepcid 20 mg daily which is now available over-the-counter.    Sincerely,      Vince Beckett MD  Internal Medicine

## 2019-12-31 NOTE — PROGRESS NOTES
Subjective     Alison Dash is a 79 year old female who presents to clinic today for the following health issues:    HPI   Hyperlipidemia Follow-Up      Are you regularly taking any medication or supplement to lower your cholesterol?   Yes- simvastatin     Are you having muscle aches or other side effects that you think could be caused by your cholesterol lowering medication?  No    Hypertension Follow-up      Do you check your blood pressure regularly outside of the clinic? No     Are you following a low salt diet? No    Are your blood pressures ever more than 140 on the top number (systolic) OR more   than 90 on the bottom number (diastolic), for example 140/90? N/a       How many servings of fruits and vegetables do you eat daily?  0-1    On average, how many sweetened beverages do you drink each day (Examples: soda, juice, sweet tea, etc.  Do NOT count diet or artificially sweetened beverages)?   0-1    How many days per week do you miss taking your medication? 0    Patient Active Problem List   Diagnosis     Tobacco use disorder     Low back pain     Hyperlipidemia with target LDL less than 100     CKD (chronic kidney disease) stage 3, GFR 30-59 ml/min (H)     Advanced directives, counseling/discussion     Colonoscopy refused     Essential hypertension with goal blood pressure less than 140/90     Gastroesophageal reflux disease without esophagitis     Past Surgical History:   Procedure Laterality Date     NO HISTORY OF SURGERY         Social History     Tobacco Use     Smoking status: Former Smoker     Packs/day: 0.50     Last attempt to quit: 2010     Years since quittin.7     Smokeless tobacco: Never Used   Substance Use Topics     Alcohol use: Yes     Comment: SOCIAL     Family History   Problem Relation Age of Onset     C.A.D. Mother          Current Outpatient Medications   Medication Sig Dispense Refill     amLODIPine (NORVASC) 10 MG tablet Take 1 tablet (10 mg) by mouth daily 90 tablet 3      "ARB NOT PRESCRIBED, INTENTIONAL, by Other route continuous prn.  0     aspirin 325 MG EC tablet Take 1 tablet by mouth daily.       fish oil-omega-3 fatty acids 1000 MG capsule Take 2 capsules by mouth daily. 180 capsule 12     Multiple Minerals-Vitamins (CALCIUM CITRATE +) TABS Take  by mouth. 630 mg daily       Multiple Vitamins-Minerals (PRESERVISION/LUTEIN) CAPS Take 1 Can by mouth 2 times daily.       simvastatin (ZOCOR) 40 MG tablet Take 1 tablet (40 mg) by mouth At Bedtime 90 tablet 3     ZANTAC 75 75 MG OR TABS 1 QD       Allergies   Allergen Reactions     Lisinopril      Cough       BP Readings from Last 3 Encounters:   02/14/19 130/72   01/17/19 124/76   12/18/18 100/65    Wt Readings from Last 3 Encounters:   02/14/19 77.6 kg (171 lb)   01/17/19 75.8 kg (167 lb)   12/18/18 76.5 kg (168 lb 11.2 oz)            Reviewed and updated as needed this visit by Provider         Review of Systems   ROS COMP: CONSTITUTIONAL: NEGATIVE for fever, chills, change in weight  INTEGUMENTARY/SKIN: NEGATIVE for worrisome rashes, moles or lesions  EYES: NEGATIVE for vision changes or irritation  ENT/MOUTH: NEGATIVE for ear, mouth and throat problems  RESP: NEGATIVE for significant cough or SOB  CV: NEGATIVE for chest pain, palpitations or peripheral edema  GI: NEGATIVE for nausea, abdominal pain, heartburn, or change in bowel habits  : NEGATIVE for frequency, dysuria, or hematuria  MUSCULOSKELETAL: NEGATIVE for significant arthralgias or myalgia  NEURO: NEGATIVE for weakness, dizziness or paresthesias  HEME: NEGATIVE for bleeding problems  PSYCHIATRIC: NEGATIVE for changes in mood or affect      Objective    /78   Pulse 77   Temp 97.6  F (36.4  C) (Oral)   Resp 15   Ht 1.626 m (5' 4\")   Wt 76.7 kg (169 lb 3.2 oz)   SpO2 98%   BMI 29.04 kg/m    Body mass index is 29.04 kg/m .  Physical Exam   GENERAL: healthy, alert and no distress  EYES: Eyes grossly normal to inspection, PERRL and conjunctivae and sclerae " "normal  HENT: ear canals and TM's normal, nose and mouth without ulcers or lesions  NECK: no adenopathy, no asymmetry, masses, or scars and thyroid normal to palpation  RESP: lungs clear to auscultation - no rales, rhonchi or wheezes  CV: regular rate and rhythm, normal S1 S2, no S3 or S4, no murmur, click or rub, no peripheral edema and peripheral pulses strong  MS: no gross musculoskeletal defects noted, no edema  NEURO: No focal changes  PSYCH: mentation appears normal, affect normal/bright        Assessment & Plan     (I10) Essential hypertension with goal blood pressure less than 140/90  (primary encounter diagnosis)  Comment: Stable on therapy and continuing with current medical management as ordered.  Plan: PAF COMPLETED, amLODIPine (NORVASC) 10 MG         tablet        Recheck blood pressure 6 months    (N18.3) CKD (chronic kidney disease) stage 3, GFR 30-59 ml/min (H)  Comment: Recheck baseline kidney function tests with metabolic panel today.  Plan:     (E78.5) Hyperlipidemia with target LDL less than 100  Comment: Labs ordered as fasting per routine and continuing with statin therapy  Plan: PAF COMPLETED, simvastatin (ZOCOR) 40 MG tablet            (K21.9) Gastroesophageal reflux disease without esophagitis  Comment: Stable continue with dietary control.  Plan:     (Z78.0) Asymptomatic postmenopausal status  Comment: Offered his routine screening but patient will likely be declining  Plan: DEXA HIP/PELVIS/SPINE - Future            (Z12.31) Visit for screening mammogram  Comment: Offered his routine screening but patient will likely be declining  Plan: *MA Screening Digital Bilateral          Patient also offered colonoscopy but declined and advised to obtain shingles vaccination.     BMI:   Estimated body mass index is 29.35 kg/m  as calculated from the following:    Height as of 2/14/19: 1.626 m (5' 4\").    Weight as of 2/14/19: 77.6 kg (171 lb).     See Patient Instructions    Return in about 6 months " (around 6/30/2020) for BP Recheck.    Vince Beckett MD  [facility]

## 2020-01-06 ENCOUNTER — TELEPHONE (OUTPATIENT)
Dept: INTERNAL MEDICINE | Facility: CLINIC | Age: 80
End: 2020-01-06

## 2020-01-06 DIAGNOSIS — E78.5 HYPERLIPIDEMIA WITH TARGET LDL LESS THAN 100: ICD-10-CM

## 2020-01-06 RX ORDER — SIMVASTATIN 40 MG
TABLET ORAL
Qty: 90 TABLET | Refills: 3 | OUTPATIENT
Start: 2020-01-06

## 2020-01-06 NOTE — TELEPHONE ENCOUNTER
FYI:  Patient wanted  to let Dr. Beckett know that she no longer takes Zantac and hasn't for 3 years. Patient states acid reflux is gone.

## 2020-01-06 NOTE — TELEPHONE ENCOUNTER
simvastatin (ZOCOR) 40 MG tablet 90 tablet 3 12/31/2019  No   Sig - Route: Take 1 tablet (40 mg) by mouth At Bedtime - Oral   Sent to pharmacy as: simvastatin (ZOCOR) 40 MG tablet   Class: E-Prescribe   Order: [id number]   E-Prescribing Status: Receipt confirmed by pharmacy (12/31/2019  7:44 AM CST)     Duplicate request.   Closing encounter.     Jesi Montanez RN

## 2020-01-08 ENCOUNTER — DOCUMENTATION ONLY (OUTPATIENT)
Dept: OTHER | Facility: CLINIC | Age: 80
End: 2020-01-08

## 2020-12-22 ENCOUNTER — OFFICE VISIT (OUTPATIENT)
Dept: INTERNAL MEDICINE | Facility: CLINIC | Age: 80
End: 2020-12-22
Payer: COMMERCIAL

## 2020-12-22 VITALS
WEIGHT: 163.6 LBS | RESPIRATION RATE: 15 BRPM | TEMPERATURE: 98.4 F | BODY MASS INDEX: 27.93 KG/M2 | SYSTOLIC BLOOD PRESSURE: 136 MMHG | HEART RATE: 88 BPM | OXYGEN SATURATION: 96 % | HEIGHT: 64 IN | DIASTOLIC BLOOD PRESSURE: 76 MMHG

## 2020-12-22 DIAGNOSIS — I10 ESSENTIAL HYPERTENSION WITH GOAL BLOOD PRESSURE LESS THAN 140/90: Primary | ICD-10-CM

## 2020-12-22 DIAGNOSIS — E78.5 HYPERLIPIDEMIA WITH TARGET LDL LESS THAN 100: ICD-10-CM

## 2020-12-22 DIAGNOSIS — Z12.31 VISIT FOR SCREENING MAMMOGRAM: ICD-10-CM

## 2020-12-22 DIAGNOSIS — Z12.11 COLON CANCER SCREENING: ICD-10-CM

## 2020-12-22 LAB
ALBUMIN SERPL-MCNC: 3.4 G/DL (ref 3.4–5)
ALP SERPL-CCNC: 118 U/L (ref 40–150)
ALT SERPL W P-5'-P-CCNC: 23 U/L (ref 0–50)
ANION GAP SERPL CALCULATED.3IONS-SCNC: 3 MMOL/L (ref 3–14)
AST SERPL W P-5'-P-CCNC: 19 U/L (ref 0–45)
BILIRUB SERPL-MCNC: 0.7 MG/DL (ref 0.2–1.3)
BUN SERPL-MCNC: 23 MG/DL (ref 7–30)
CALCIUM SERPL-MCNC: 9.7 MG/DL (ref 8.5–10.1)
CHLORIDE SERPL-SCNC: 111 MMOL/L (ref 94–109)
CHOLEST SERPL-MCNC: 141 MG/DL
CO2 SERPL-SCNC: 28 MMOL/L (ref 20–32)
CREAT SERPL-MCNC: 0.96 MG/DL (ref 0.52–1.04)
GFR SERPL CREATININE-BSD FRML MDRD: 56 ML/MIN/{1.73_M2}
GLUCOSE SERPL-MCNC: 102 MG/DL (ref 70–99)
HDLC SERPL-MCNC: 43 MG/DL
HGB BLD-MCNC: 15.1 G/DL (ref 11.7–15.7)
LDLC SERPL CALC-MCNC: 67 MG/DL
NONHDLC SERPL-MCNC: 98 MG/DL
POTASSIUM SERPL-SCNC: 4.5 MMOL/L (ref 3.4–5.3)
PROT SERPL-MCNC: 7 G/DL (ref 6.8–8.8)
SODIUM SERPL-SCNC: 142 MMOL/L (ref 133–144)
TRIGL SERPL-MCNC: 154 MG/DL

## 2020-12-22 PROCEDURE — 80053 COMPREHEN METABOLIC PANEL: CPT | Performed by: INTERNAL MEDICINE

## 2020-12-22 PROCEDURE — 80061 LIPID PANEL: CPT | Performed by: INTERNAL MEDICINE

## 2020-12-22 PROCEDURE — 85018 HEMOGLOBIN: CPT | Performed by: INTERNAL MEDICINE

## 2020-12-22 PROCEDURE — 36415 COLL VENOUS BLD VENIPUNCTURE: CPT | Performed by: INTERNAL MEDICINE

## 2020-12-22 PROCEDURE — 99213 OFFICE O/P EST LOW 20 MIN: CPT | Performed by: INTERNAL MEDICINE

## 2020-12-22 RX ORDER — AMLODIPINE BESYLATE 10 MG/1
10 TABLET ORAL DAILY
Qty: 90 TABLET | Refills: 3 | Status: SHIPPED | OUTPATIENT
Start: 2020-12-22 | End: 2022-01-10

## 2020-12-22 RX ORDER — SIMVASTATIN 40 MG
40 TABLET ORAL AT BEDTIME
Qty: 90 TABLET | Refills: 3 | Status: SHIPPED | OUTPATIENT
Start: 2020-12-22 | End: 2022-01-10

## 2020-12-22 ASSESSMENT — MIFFLIN-ST. JEOR: SCORE: 1197.08

## 2020-12-22 NOTE — LETTER
[facility]  [address] [address]  [phone]      12/22/2020       Alison Dash  [address] [facility] [address]        Dear Alison,    I am pleased to inform you that your routine blood work including your hemoglobin, sodium, potassium, calcium, kidney and liver function tests are all normal.    Your blood sugar function test is slightly abnormal and elevated and should be rechecked here in the clinic in 12 months with a follow-up visit with me, fasting.  I will look forward to seeing you at that time and please call to make an appointment.  In the meantime, please work on your diet limiting your carbohydrates and getting some good, regular exercise as this will also help improve your triglyceride level.    Your cholesterol looks stable and I would not change anything at this point but would repeat your labs in 12 months.    Sincerely,      Vince Beckett MD  Internal Medicine

## 2020-12-22 NOTE — PROGRESS NOTES
"Subjective     Alison Dash is a 80 year old female who presents to clinic today for the following health issues:    HPI         Hyperlipidemia Follow-Up      Are you regularly taking any medication or supplement to lower your cholesterol?   Yes- simastatin     Are you having muscle aches or other side effects that you think could be caused by your cholesterol lowering medication?  No    Hypertension Follow-up      Do you check your blood pressure regularly outside of the clinic? No     Are you following a low salt diet? No    Are your blood pressures ever more than 140 on the top number (systolic) OR more   than 90 on the bottom number (diastolic), for example 140/90? No      Review of Systems   CONSTITUTIONAL: NEGATIVE for fever, chills, change in weight  EYES: NEGATIVE for vision changes or irritation  ENT/MOUTH: NEGATIVE for ear, mouth and throat problems  RESP: NEGATIVE for significant cough or SOB  CV: NEGATIVE for chest pain, palpitations or peripheral edema  GI: NEGATIVE for nausea, abdominal pain, heartburn, or change in bowel habits  : NEGATIVE for frequency, dysuria, or hematuria  MUSCULOSKELETAL: NEGATIVE for significant arthralgias or myalgia  NEURO: NEGATIVE for weakness, dizziness or paresthesias  HEME: NEGATIVE for bleeding problems  PSYCHIATRIC: NEGATIVE for changes in mood or affect      Objective    /76   Pulse 88   Temp 98.4  F (36.9  C) (Temporal)   Resp 15   Ht 1.626 m (5' 4\")   Wt 74.2 kg (163 lb 9.6 oz)   SpO2 96%   BMI 28.08 kg/m    Body mass index is 28.08 kg/m .  Physical Exam   GENERAL: alert and no distress  EYES: Eyes grossly normal to inspection, PERRL and conjunctivae and sclerae normal  HENT: ear canals and TM's normal, nose and mouth without ulcers or lesions  NECK: no adenopathy, no asymmetry, masses, or scars and thyroid normal to palpation  RESP: lungs clear to auscultation - no rales, rhonchi or wheezes  CV: regular rate and rhythm, normal S1 S2, no S3 or S4, no " "click or rub, no peripheral edema and peripheral pulses strong  MS: no gross musculoskeletal defects noted  NEURO: No focal changes  PSYCH: mentation appears normal, affect normal/bright        Assessment & Plan     Essential hypertension with goal blood pressure less than 140/90  Able on therapy continue with current medical management.  - amLODIPine (NORVASC) 10 MG tablet; Take 1 tablet (10 mg) by mouth daily  - Hemoglobin  - Comprehensive metabolic panel    Hyperlipidemia with target LDL less than 100  Labs ordered with lipid panel recommended.  - simvastatin (ZOCOR) 40 MG tablet; Take 1 tablet (40 mg) by mouth At Bedtime  - Lipid Profile    Visit for screening mammogram  Advised screening mammogram but patient has declined.  Advised bone density scan the patient has declined.  - *MA Screening Digital Bilateral; Future    Colon cancer screening  Advised colonoscopy consideration and FIT testing the patient has declined.       BMI:   Estimated body mass index is 29.04 kg/m  as calculated from the following:    Height as of 12/31/19: 1.626 m (5' 4\").    Weight as of 12/31/19: 76.7 kg (169 lb 3.2 oz).      See Patient Instructions, advised Shingrix    Return in about 6 months (around 6/22/2021) for BP Recheck.    Vince Beckett MD  [facility]    "

## 2021-04-08 ENCOUNTER — IMMUNIZATION (OUTPATIENT)
Dept: NURSING | Facility: CLINIC | Age: 81
End: 2021-04-08
Payer: COMMERCIAL

## 2021-04-08 PROCEDURE — 91300 PR COVID VAC PFIZER DIL RECON 30 MCG/0.3 ML IM: CPT

## 2021-04-08 PROCEDURE — 0001A PR COVID VAC PFIZER DIL RECON 30 MCG/0.3 ML IM: CPT

## 2021-04-29 ENCOUNTER — IMMUNIZATION (OUTPATIENT)
Dept: NURSING | Facility: CLINIC | Age: 81
End: 2021-04-29
Attending: INTERNAL MEDICINE
Payer: COMMERCIAL

## 2021-04-29 PROCEDURE — 91300 PR COVID VAC PFIZER DIL RECON 30 MCG/0.3 ML IM: CPT

## 2021-04-29 PROCEDURE — 0002A PR COVID VAC PFIZER DIL RECON 30 MCG/0.3 ML IM: CPT

## 2022-01-10 ENCOUNTER — VIRTUAL VISIT (OUTPATIENT)
Dept: INTERNAL MEDICINE | Facility: CLINIC | Age: 82
End: 2022-01-10
Payer: COMMERCIAL

## 2022-01-10 DIAGNOSIS — N18.30 STAGE 3 CHRONIC KIDNEY DISEASE, UNSPECIFIED WHETHER STAGE 3A OR 3B CKD (H): ICD-10-CM

## 2022-01-10 DIAGNOSIS — I10 ESSENTIAL HYPERTENSION WITH GOAL BLOOD PRESSURE LESS THAN 140/90: Primary | ICD-10-CM

## 2022-01-10 DIAGNOSIS — E78.5 HYPERLIPIDEMIA WITH TARGET LDL LESS THAN 100: ICD-10-CM

## 2022-01-10 PROCEDURE — 99214 OFFICE O/P EST MOD 30 MIN: CPT | Mod: 95 | Performed by: INTERNAL MEDICINE

## 2022-01-10 RX ORDER — AMLODIPINE BESYLATE 10 MG/1
10 TABLET ORAL DAILY
Qty: 90 TABLET | Refills: 3 | Status: SHIPPED | OUTPATIENT
Start: 2022-01-10 | End: 2023-01-11

## 2022-01-10 RX ORDER — SIMVASTATIN 40 MG
40 TABLET ORAL AT BEDTIME
Qty: 90 TABLET | Refills: 3 | Status: SHIPPED | OUTPATIENT
Start: 2022-01-10 | End: 2023-01-11

## 2022-01-10 NOTE — PROGRESS NOTES
Alison is a 81 year old who is being evaluated via a billable telephone visit.      What phone number would you like to be contacted at? Home number  How would you like to obtain your AVS? Mail a copy    Assessment & Plan     Essential hypertension with goal blood pressure less than 140/90  Stable and continue with current medication.  Lab orders placed and patient will schedule fasting appointment  - Comprehensive metabolic panel; Future  - amLODIPine (NORVASC) 10 MG tablet; Take 1 tablet (10 mg) by mouth daily    Stage 3 chronic kidney disease, unspecified whether stage 3a or 3b CKD (H)  Continue to recheck and follow creatinine  - CBC with platelets; Future  - Comprehensive metabolic panel; Future    Hyperlipidemia with target LDL less than 100  Currently on statin therapy, continuing as ordered with lipid panel placed  - Lipid Profile; Future  - simvastatin (ZOCOR) 40 MG tablet; Take 1 tablet (40 mg) by mouth At Bedtime     See Patient Instructions    Return in about 1 week (around 1/17/2022) for Lab Work appointment.    Vince Beckett MD  [facility]   Alison is a 81 year old who presents for the following health issues  accompanied by her self.    HPI:    Called and discussed with patient.  She is due for annual lab work.  She has been feeling well.  She denies any major complaints other than the fact that she fell off the back of a truck wagon the other day and landed on some wood.  We reviewed the fact that she has not had a tetanus booster and she will get that done at her convenience.    She states that she has had her blood pressure checked at home and those results are listed below.  She has been tolerating her medication without difficulty.    BP at home 122/82    I reiterated the potential benefit of mammograms, bone density scans and colonoscopies but she is not interested and declines those in the future.    Review of Systems   CONSTITUTIONAL: NEGATIVE for  fever, chills, change in weight  EYES: NEGATIVE for vision changes or irritation  ENT/MOUTH: NEGATIVE for ear, mouth and throat problems  RESP: NEGATIVE for significant cough or SOB  CV: NEGATIVE for chest pain, palpitations or peripheral edema  GI: NEGATIVE for nausea, abdominal pain, heartburn, or change in bowel habits  : NEGATIVE for frequency, dysuria, or hematuria  MUSCULOSKELETAL: NEGATIVE for significant arthralgias or myalgia  NEURO: NEGATIVE for weakness, dizziness or paresthesias  HEME: NEGATIVE for bleeding problems  PSYCHIATRIC: NEGATIVE for changes in mood or affect    Current Outpatient Medications   Medication     amLODIPine (NORVASC) 10 MG tablet     simvastatin (ZOCOR) 40 MG tablet     ARB NOT PRESCRIBED, INTENTIONAL,     aspirin 325 MG EC tablet     fish oil-omega-3 fatty acids 1000 MG capsule     Multiple Vitamins-Minerals (PRESERVISION/LUTEIN) CAPS     No current facility-administered medications for this visit.           Objective         Vitals:  No vitals were obtained today due to virtual visit.    Physical Exam   alert and no distress  PSYCH: Alert and oriented times 3; coherent speech, normal   rate and volume, able to articulate logical thoughts, able   to abstract reason, no tangential thoughts, no hallucinations   or delusions  Her affect is normal  RESP: No cough, no audible wheezing, able to talk in full sentences  Remainder of exam unable to be completed due to telephone visits            Phone call duration: 13 minutes

## 2022-01-12 ENCOUNTER — LAB (OUTPATIENT)
Dept: LAB | Facility: CLINIC | Age: 82
End: 2022-01-12
Payer: COMMERCIAL

## 2022-01-12 DIAGNOSIS — N18.30 CKD (CHRONIC KIDNEY DISEASE) STAGE 3, GFR 30-59 ML/MIN (H): Primary | ICD-10-CM

## 2022-01-12 DIAGNOSIS — I10 ESSENTIAL HYPERTENSION WITH GOAL BLOOD PRESSURE LESS THAN 140/90: ICD-10-CM

## 2022-01-12 DIAGNOSIS — N18.30 STAGE 3 CHRONIC KIDNEY DISEASE, UNSPECIFIED WHETHER STAGE 3A OR 3B CKD (H): ICD-10-CM

## 2022-01-12 DIAGNOSIS — E78.5 HYPERLIPIDEMIA WITH TARGET LDL LESS THAN 100: ICD-10-CM

## 2022-01-12 LAB
ALBUMIN SERPL-MCNC: 3.7 G/DL (ref 3.4–5)
ALP SERPL-CCNC: 147 U/L (ref 40–150)
ALT SERPL W P-5'-P-CCNC: 30 U/L (ref 0–50)
ANION GAP SERPL CALCULATED.3IONS-SCNC: 9 MMOL/L (ref 3–14)
AST SERPL W P-5'-P-CCNC: 22 U/L (ref 0–45)
BILIRUB SERPL-MCNC: 0.8 MG/DL (ref 0.2–1.3)
BUN SERPL-MCNC: 18 MG/DL (ref 7–30)
CALCIUM SERPL-MCNC: 9.5 MG/DL (ref 8.5–10.1)
CHLORIDE BLD-SCNC: 108 MMOL/L (ref 94–109)
CHOLEST SERPL-MCNC: 140 MG/DL
CO2 SERPL-SCNC: 23 MMOL/L (ref 20–32)
CREAT SERPL-MCNC: 0.99 MG/DL (ref 0.52–1.04)
CREAT UR-MCNC: 102 MG/DL
ERYTHROCYTE [DISTWIDTH] IN BLOOD BY AUTOMATED COUNT: 12.5 % (ref 10–15)
FASTING STATUS PATIENT QL REPORTED: YES
GFR SERPL CREATININE-BSD FRML MDRD: 57 ML/MIN/1.73M2
GLUCOSE BLD-MCNC: 107 MG/DL (ref 70–99)
HCT VFR BLD AUTO: 48.2 % (ref 35–47)
HDLC SERPL-MCNC: 49 MG/DL
HGB BLD-MCNC: 15.2 G/DL (ref 11.7–15.7)
LDLC SERPL CALC-MCNC: 65 MG/DL
MCH RBC QN AUTO: 30.4 PG (ref 26.5–33)
MCHC RBC AUTO-ENTMCNC: 31.5 G/DL (ref 31.5–36.5)
MCV RBC AUTO: 96 FL (ref 78–100)
MICROALBUMIN UR-MCNC: 14 MG/L
MICROALBUMIN/CREAT UR: 13.73 MG/G CR (ref 0–25)
NONHDLC SERPL-MCNC: 91 MG/DL
PLATELET # BLD AUTO: 252 10E3/UL (ref 150–450)
POTASSIUM BLD-SCNC: 4 MMOL/L (ref 3.4–5.3)
PROT SERPL-MCNC: 6.6 G/DL (ref 6.8–8.8)
RBC # BLD AUTO: 5 10E6/UL (ref 3.8–5.2)
SODIUM SERPL-SCNC: 140 MMOL/L (ref 133–144)
TRIGL SERPL-MCNC: 130 MG/DL
WBC # BLD AUTO: 9.8 10E3/UL (ref 4–11)

## 2022-01-12 PROCEDURE — 80061 LIPID PANEL: CPT

## 2022-01-12 PROCEDURE — 36415 COLL VENOUS BLD VENIPUNCTURE: CPT

## 2022-01-12 PROCEDURE — 82043 UR ALBUMIN QUANTITATIVE: CPT

## 2022-01-12 PROCEDURE — 80053 COMPREHEN METABOLIC PANEL: CPT

## 2022-01-12 PROCEDURE — 85027 COMPLETE CBC AUTOMATED: CPT

## 2022-12-27 ENCOUNTER — TELEPHONE (OUTPATIENT)
Dept: INTERNAL MEDICINE | Facility: CLINIC | Age: 82
End: 2022-12-27

## 2022-12-27 DIAGNOSIS — I10 ESSENTIAL HYPERTENSION WITH GOAL BLOOD PRESSURE LESS THAN 140/90: ICD-10-CM

## 2022-12-27 DIAGNOSIS — E78.5 HYPERLIPIDEMIA WITH TARGET LDL LESS THAN 100: ICD-10-CM

## 2022-12-27 NOTE — TELEPHONE ENCOUNTER
Called and spoke to the patient. Scheduled patient for an appointment with Dr. Beckett. Patient states she will have enough medication to last her until her appointment.     Appointments in Next Year    Jan 11, 2023 10:30 AM  (Arrive by 10:10 AM)  Provider Visit with Vince Beckett MD  [facility] ([facility] ) [phone]        Amy Cabrera RN

## 2022-12-27 NOTE — TELEPHONE ENCOUNTER
Med check and clinic appointment likely not available due to schedule limitations.  Suggest patient's schedule appointment when able.  May use same-day appointment, team appointment or consider nurse only blood pressure check.

## 2022-12-27 NOTE — TELEPHONE ENCOUNTER
Reason for call:  Other   Patient called regarding (reason for call): appointment  Additional comments: Patient would like a med check appointment with PCP Dr Vince Beckett this week for blood pressure and cholesterol.  She has enough mediation for 1 week.     Phone number to reach patient:  Cell number on file:    No relevant phone numbers on file.       Best Time:  anytime    Can we leave a detailed message on this number?  YES    Travel screening: Not Applicable

## 2023-01-11 ENCOUNTER — OFFICE VISIT (OUTPATIENT)
Dept: INTERNAL MEDICINE | Facility: CLINIC | Age: 83
End: 2023-01-11
Payer: COMMERCIAL

## 2023-01-11 VITALS
SYSTOLIC BLOOD PRESSURE: 138 MMHG | TEMPERATURE: 97.3 F | WEIGHT: 163.6 LBS | OXYGEN SATURATION: 96 % | BODY MASS INDEX: 27.93 KG/M2 | RESPIRATION RATE: 15 BRPM | HEIGHT: 64 IN | DIASTOLIC BLOOD PRESSURE: 78 MMHG | HEART RATE: 88 BPM

## 2023-01-11 DIAGNOSIS — I10 ESSENTIAL HYPERTENSION WITH GOAL BLOOD PRESSURE LESS THAN 140/90: ICD-10-CM

## 2023-01-11 DIAGNOSIS — N18.30 STAGE 3 CHRONIC KIDNEY DISEASE, UNSPECIFIED WHETHER STAGE 3A OR 3B CKD (H): ICD-10-CM

## 2023-01-11 DIAGNOSIS — Z12.31 VISIT FOR SCREENING MAMMOGRAM: ICD-10-CM

## 2023-01-11 DIAGNOSIS — Z12.11 COLON CANCER SCREENING: ICD-10-CM

## 2023-01-11 DIAGNOSIS — E78.5 HYPERLIPIDEMIA WITH TARGET LDL LESS THAN 100: ICD-10-CM

## 2023-01-11 DIAGNOSIS — Z00.00 ENCOUNTER FOR SUBSEQUENT ANNUAL WELLNESS VISIT IN MEDICARE PATIENT: Primary | ICD-10-CM

## 2023-01-11 PROCEDURE — G0439 PPPS, SUBSEQ VISIT: HCPCS | Performed by: INTERNAL MEDICINE

## 2023-01-11 PROCEDURE — 99214 OFFICE O/P EST MOD 30 MIN: CPT | Mod: 25 | Performed by: INTERNAL MEDICINE

## 2023-01-11 RX ORDER — TIMOLOL MALEATE 5 MG/ML
SOLUTION/ DROPS OPHTHALMIC
COMMUNITY
Start: 2022-10-24

## 2023-01-11 RX ORDER — LATANOPROST 50 UG/ML
SOLUTION/ DROPS OPHTHALMIC
COMMUNITY
Start: 2022-10-24

## 2023-01-11 RX ORDER — SIMVASTATIN 40 MG
40 TABLET ORAL AT BEDTIME
Qty: 90 TABLET | Refills: 3 | Status: SHIPPED | OUTPATIENT
Start: 2023-01-11 | End: 2023-12-26

## 2023-01-11 RX ORDER — AMLODIPINE BESYLATE 10 MG/1
10 TABLET ORAL DAILY
Qty: 90 TABLET | Refills: 3 | Status: SHIPPED | OUTPATIENT
Start: 2023-01-11 | End: 2023-12-26
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
## 2023-02-02 ENCOUNTER — LAB (OUTPATIENT)
Dept: LAB | Facility: CLINIC | Age: 83
End: 2023-02-02
Payer: COMMERCIAL

## 2023-02-02 DIAGNOSIS — N18.30 CKD (CHRONIC KIDNEY DISEASE) STAGE 3, GFR 30-59 ML/MIN (H): Primary | ICD-10-CM

## 2023-02-02 DIAGNOSIS — N18.30 STAGE 3 CHRONIC KIDNEY DISEASE, UNSPECIFIED WHETHER STAGE 3A OR 3B CKD (H): ICD-10-CM

## 2023-02-02 DIAGNOSIS — Z00.00 ENCOUNTER FOR SUBSEQUENT ANNUAL WELLNESS VISIT IN MEDICARE PATIENT: ICD-10-CM

## 2023-02-02 DIAGNOSIS — E78.5 HYPERLIPIDEMIA WITH TARGET LDL LESS THAN 100: ICD-10-CM

## 2023-02-02 LAB
ALBUMIN SERPL BCG-MCNC: 3.8 G/DL (ref 3.5–5.2)
ALP SERPL-CCNC: 122 U/L (ref 35–104)
ALT SERPL W P-5'-P-CCNC: 18 U/L (ref 10–35)
ANION GAP SERPL CALCULATED.3IONS-SCNC: 10 MMOL/L (ref 7–15)
AST SERPL W P-5'-P-CCNC: 23 U/L (ref 10–35)
BILIRUB SERPL-MCNC: 0.6 MG/DL
BUN SERPL-MCNC: 15 MG/DL (ref 8–23)
CALCIUM SERPL-MCNC: 9.8 MG/DL (ref 8.8–10.2)
CHLORIDE SERPL-SCNC: 109 MMOL/L (ref 98–107)
CHOLEST SERPL-MCNC: 120 MG/DL
CREAT SERPL-MCNC: 0.92 MG/DL (ref 0.51–0.95)
DEPRECATED HCO3 PLAS-SCNC: 25 MMOL/L (ref 22–29)
ERYTHROCYTE [DISTWIDTH] IN BLOOD BY AUTOMATED COUNT: 12.4 % (ref 10–15)
GFR SERPL CREATININE-BSD FRML MDRD: 62 ML/MIN/1.73M2
GLUCOSE SERPL-MCNC: 99 MG/DL (ref 70–99)
HCT VFR BLD AUTO: 45.6 % (ref 35–47)
HDLC SERPL-MCNC: 39 MG/DL
HGB BLD-MCNC: 14.9 G/DL (ref 11.7–15.7)
LDLC SERPL CALC-MCNC: 55 MG/DL
MCH RBC QN AUTO: 31.2 PG (ref 26.5–33)
MCHC RBC AUTO-ENTMCNC: 32.7 G/DL (ref 31.5–36.5)
MCV RBC AUTO: 95 FL (ref 78–100)
NONHDLC SERPL-MCNC: 81 MG/DL
PLATELET # BLD AUTO: 231 10E3/UL (ref 150–450)
POTASSIUM SERPL-SCNC: 4.1 MMOL/L (ref 3.4–5.3)
PROT SERPL-MCNC: 6.1 G/DL (ref 6.4–8.3)
RBC # BLD AUTO: 4.78 10E6/UL (ref 3.8–5.2)
SODIUM SERPL-SCNC: 144 MMOL/L (ref 136–145)
TRIGL SERPL-MCNC: 132 MG/DL
WBC # BLD AUTO: 5.9 10E3/UL (ref 4–11)

## 2023-02-02 PROCEDURE — 80053 COMPREHEN METABOLIC PANEL: CPT

## 2023-02-02 PROCEDURE — 85027 COMPLETE CBC AUTOMATED: CPT

## 2023-02-02 PROCEDURE — 36415 COLL VENOUS BLD VENIPUNCTURE: CPT

## 2023-02-02 PROCEDURE — 80061 LIPID PANEL: CPT

## 2023-12-26 DIAGNOSIS — E78.5 HYPERLIPIDEMIA WITH TARGET LDL LESS THAN 100: ICD-10-CM

## 2023-12-26 DIAGNOSIS — I10 ESSENTIAL HYPERTENSION WITH GOAL BLOOD PRESSURE LESS THAN 140/90: ICD-10-CM

## 2023-12-26 RX ORDER — SIMVASTATIN 40 MG
40 TABLET ORAL AT BEDTIME
Qty: 90 TABLET | Refills: 0 | Status: SHIPPED | OUTPATIENT
Start: 2023-12-26 | End: 2024-04-04

## 2023-12-26 RX ORDER — AMLODIPINE BESYLATE 10 MG/1
10 TABLET ORAL DAILY
Qty: 90 TABLET | Refills: 0 | Status: SHIPPED | OUTPATIENT
Start: 2023-12-26 | End: 2024-04-04

## 2024-04-04 DIAGNOSIS — E78.5 HYPERLIPIDEMIA WITH TARGET LDL LESS THAN 100: ICD-10-CM

## 2024-04-04 DIAGNOSIS — I10 ESSENTIAL HYPERTENSION WITH GOAL BLOOD PRESSURE LESS THAN 140/90: ICD-10-CM

## 2024-04-04 RX ORDER — AMLODIPINE BESYLATE 10 MG/1
10 TABLET ORAL DAILY
Qty: 90 TABLET | Refills: 0 | Status: SHIPPED | OUTPATIENT
Start: 2024-04-04 | End: 2024-06-03

## 2024-04-04 RX ORDER — SIMVASTATIN 40 MG
40 TABLET ORAL AT BEDTIME
Qty: 90 TABLET | Refills: 0 | Status: SHIPPED | OUTPATIENT
Start: 2024-04-04 | End: 2024-06-03

## 2024-04-04 RX ORDER — AMLODIPINE BESYLATE 10 MG/1
10 TABLET ORAL DAILY
Qty: 90 TABLET | Refills: 2 | OUTPATIENT
Start: 2024-04-04

## 2024-04-04 RX ORDER — SIMVASTATIN 40 MG
40 TABLET ORAL AT BEDTIME
Qty: 90 TABLET | Refills: 2 | OUTPATIENT
Start: 2024-04-04

## 2024-04-04 NOTE — TELEPHONE ENCOUNTER
Patient informed and scheduled. Requesting refills until appointment date.    Next 5 appointments (look out 90 days)      Jun 03, 2024  7:30 AM  (Arrive by 7:10 AM)  Adult Preventative Visit with Vince Beckett MD  [facility] ([facility] ) [address] [id number] [phone]

## 2024-06-03 ENCOUNTER — OFFICE VISIT (OUTPATIENT)
Dept: INTERNAL MEDICINE | Facility: CLINIC | Age: 84
End: 2024-06-03
Payer: COMMERCIAL

## 2024-06-03 VITALS
TEMPERATURE: 97.5 F | HEIGHT: 61 IN | DIASTOLIC BLOOD PRESSURE: 76 MMHG | WEIGHT: 153 LBS | BODY MASS INDEX: 28.89 KG/M2 | OXYGEN SATURATION: 96 % | HEART RATE: 68 BPM | SYSTOLIC BLOOD PRESSURE: 128 MMHG

## 2024-06-03 DIAGNOSIS — Z00.00 ENCOUNTER FOR SUBSEQUENT ANNUAL WELLNESS VISIT IN MEDICARE PATIENT: Primary | ICD-10-CM

## 2024-06-03 DIAGNOSIS — N18.30 STAGE 3 CHRONIC KIDNEY DISEASE, UNSPECIFIED WHETHER STAGE 3A OR 3B CKD (H): ICD-10-CM

## 2024-06-03 DIAGNOSIS — I10 ESSENTIAL HYPERTENSION WITH GOAL BLOOD PRESSURE LESS THAN 140/90: ICD-10-CM

## 2024-06-03 DIAGNOSIS — E78.5 HYPERLIPIDEMIA WITH TARGET LDL LESS THAN 100: ICD-10-CM

## 2024-06-03 DIAGNOSIS — Z12.11 COLON CANCER SCREENING: ICD-10-CM

## 2024-06-03 DIAGNOSIS — Z12.31 VISIT FOR SCREENING MAMMOGRAM: ICD-10-CM

## 2024-06-03 LAB
ALBUMIN SERPL BCG-MCNC: 3.9 G/DL (ref 3.5–5.2)
ALP SERPL-CCNC: 128 U/L (ref 40–150)
ALT SERPL W P-5'-P-CCNC: 19 U/L (ref 0–50)
ANION GAP SERPL CALCULATED.3IONS-SCNC: 7 MMOL/L (ref 7–15)
AST SERPL W P-5'-P-CCNC: 25 U/L (ref 0–45)
BILIRUB SERPL-MCNC: 0.8 MG/DL
BUN SERPL-MCNC: 15.3 MG/DL (ref 8–23)
CALCIUM SERPL-MCNC: 10.1 MG/DL (ref 8.8–10.2)
CHLORIDE SERPL-SCNC: 109 MMOL/L (ref 98–107)
CHOLEST SERPL-MCNC: 130 MG/DL
CREAT SERPL-MCNC: 0.98 MG/DL (ref 0.51–0.95)
DEPRECATED HCO3 PLAS-SCNC: 24 MMOL/L (ref 22–29)
EGFRCR SERPLBLD CKD-EPI 2021: 57 ML/MIN/1.73M2
ERYTHROCYTE [DISTWIDTH] IN BLOOD BY AUTOMATED COUNT: 12.1 % (ref 10–15)
FASTING STATUS PATIENT QL REPORTED: YES
FASTING STATUS PATIENT QL REPORTED: YES
GLUCOSE SERPL-MCNC: 105 MG/DL (ref 70–99)
HCT VFR BLD AUTO: 44.9 % (ref 35–47)
HDLC SERPL-MCNC: 45 MG/DL
HGB BLD-MCNC: 15 G/DL (ref 11.7–15.7)
LDLC SERPL CALC-MCNC: 64 MG/DL
MCH RBC QN AUTO: 31.2 PG (ref 26.5–33)
MCHC RBC AUTO-ENTMCNC: 33.4 G/DL (ref 31.5–36.5)
MCV RBC AUTO: 93 FL (ref 78–100)
NONHDLC SERPL-MCNC: 85 MG/DL
PLATELET # BLD AUTO: 214 10E3/UL (ref 150–450)
POTASSIUM SERPL-SCNC: 4.6 MMOL/L (ref 3.4–5.3)
PROT SERPL-MCNC: 6.1 G/DL (ref 6.4–8.3)
RBC # BLD AUTO: 4.81 10E6/UL (ref 3.8–5.2)
SODIUM SERPL-SCNC: 140 MMOL/L (ref 135–145)
TRIGL SERPL-MCNC: 106 MG/DL
WBC # BLD AUTO: 6 10E3/UL (ref 4–11)

## 2024-06-03 PROCEDURE — 99214 OFFICE O/P EST MOD 30 MIN: CPT | Mod: 25 | Performed by: INTERNAL MEDICINE

## 2024-06-03 PROCEDURE — 85027 COMPLETE CBC AUTOMATED: CPT | Performed by: INTERNAL MEDICINE

## 2024-06-03 PROCEDURE — 80053 COMPREHEN METABOLIC PANEL: CPT | Performed by: INTERNAL MEDICINE

## 2024-06-03 PROCEDURE — 80061 LIPID PANEL: CPT | Performed by: INTERNAL MEDICINE

## 2024-06-03 PROCEDURE — G0439 PPPS, SUBSEQ VISIT: HCPCS | Performed by: INTERNAL MEDICINE

## 2024-06-03 PROCEDURE — 36415 COLL VENOUS BLD VENIPUNCTURE: CPT | Performed by: INTERNAL MEDICINE

## 2024-06-03 RX ORDER — SIMVASTATIN 40 MG
40 TABLET ORAL AT BEDTIME
Qty: 90 TABLET | Refills: 3 | Status: SHIPPED | OUTPATIENT
Start: 2024-06-03

## 2024-06-03 RX ORDER — AMLODIPINE BESYLATE 10 MG/1
10 TABLET ORAL DAILY
Qty: 90 TABLET | Refills: 3 | Status: SHIPPED | OUTPATIENT
Start: 2024-06-03

## 2024-06-03 SDOH — HEALTH STABILITY: PHYSICAL HEALTH: ON AVERAGE, HOW MANY DAYS PER WEEK DO YOU ENGAGE IN MODERATE TO STRENUOUS EXERCISE (LIKE A BRISK WALK)?: 2 DAYS

## 2024-06-03 ASSESSMENT — SOCIAL DETERMINANTS OF HEALTH (SDOH): HOW OFTEN DO YOU GET TOGETHER WITH FRIENDS OR RELATIVES?: TWICE A WEEK

## 2024-06-03 NOTE — LETTER
Dr. Soares: [phone]    [facility]  [address] [address]  [phone]      6/4/2024       Alison Dash  [address] [facility] [id number]        Dear Alison,    Your hemoglobin is normal.    Your cholesterol looks good and I would not change anything at this point but would repeat your labs in 12 months.    Your function test and blood sugar function tests are slightly abnormal and elevated and should be rechecked here in the clinic in 3 months with a follow-up visit with me, fasting.  I will look forward to seeing you at that time and please call to make an appointment.  In the meantime, please work on your diet limiting your carbohydrates and getting some good, regular exercise.  Please also make sure you are drinking enough fluid and keeping yourself well-hydrated.        Sincerely,      Vince Beckett MD  Internal Medicine

## 2024-06-03 NOTE — LETTER
[facility]  [address] [place] [address]  [phone]      6/5/2024       Alison Dash  [address] [facility] [id number]        Dear Alison,    I apologize for the second letter, there was a slight misprint in your last letter.    Your kidney function tests is just slightly abnormal but stable and should be rechecked here in the clinic in 3 months with a follow-up visit with me.  I will look forward to seeing you at that time and please call to make an appointment.      Sincerely,      Vince Beckett MD  Internal Medicine

## 2024-06-03 NOTE — PROGRESS NOTES
"Preventive Care Visit  [facility]  Vince Beckett MD, Internal Medicine  Rick 3, 2024      Assessment & Plan     Encounter for subsequent annual wellness visit in Medicare patient  Advised patient consider updating routine vaccines but she is not interested  - Comprehensive metabolic panel; Future  - CBC with platelets; Future  - Lipid Profile; Future  Recommended updated bone density scan but patient has declined    Essential hypertension with goal blood pressure less than 140/90  Stable on therapy and continue with current medical management, amlodipine refill  - Comprehensive metabolic panel; Future  - amLODIPine (NORVASC) 10 MG tablet; Take 1 tablet (10 mg) by mouth daily  - OFFICE/OUTPT VISIT,EST,LEVL III    Hyperlipidemia with target LDL less than 100  Recheck lipid panel and discussed risk reduction therapy, refilled simvastatin.  - Lipid Profile; Future  - simvastatin (ZOCOR) 40 MG tablet; Take 1 tablet (40 mg) by mouth at bedtime  - OFFICE/OUTPT VISIT,EST,LEVL III    Stage 3 chronic kidney disease, unspecified whether stage 3a or 3b CKD (H)  Encouraged hydration.  Continue with creatinine follow-up.  - Comprehensive metabolic panel; Future  - Albumin Random Urine Quantitative with Creat Ratio; Future    Visit for screening mammogram  Recommended updating routine mammography but patient has declined    Colon cancer screening  Recommended continuation of screening colonoscopies or FIT testing but patient has declined    Patient has been advised of split billing requirements and indicates understanding: Yes        BMI  Estimated body mass index is 29.39 kg/m  as calculated from the following:    Height as of this encounter: 1.537 m (5' 0.5\").    Weight as of this encounter: 69.4 kg (153 lb).   Weight management plan: Discussed healthy diet and exercise guidelines    Counseling  Appropriate preventive services were discussed with this patient, including applicable screening as appropriate " for fall prevention, nutrition, physical activity, Tobacco-use cessation, weight loss and cognition.  Checklist reviewing preventive services available has been given to the patient.  Reviewed patient's diet, addressing concerns and/or questions.   She is at risk for lack of exercise and has been provided with information to increase physical activity for the benefit of her well-being.   Discussed possible causes of fatigue.     Work on weight loss  Regular exercise    Jossy Rosas is a 83 year old, presenting for the following:    Health Care Directive  Patient has a Health Care Directive on file   none    HPI    Patient is here today for wellness exam.          Social History     Tobacco Use    Smoking status: Former     Current packs/day: 0.00     Types: Cigarettes     Quit date: 2010     Years since quittin.1    Smokeless tobacco: Never   Vaping Use    Vaping status: Never Used   Substance Use Topics    Alcohol use: Yes     Comment: SOCIAL    Drug use: No          Mammogram Screening - After age 74- determine frequency with patient based on health status, life expectancy and patient goals    Reviewed and updated as needed this visit by Provider                    Lab work is in process  Current providers sharing in care for this patient include:  Patient Care Team:  Vince Beckett MD as PCP - General (Internal Medicine)  Vince Beckett MD as Assigned PCP    The following health maintenance items are reviewed in Epic and correct as of today:    Health Maintenance   Topic Date Due    URINALYSIS  Never done    RSV VACCINE (Pregnancy & 60+) (1 - 1-dose 60+ series) Never done    MICROALBUMIN  2023    ANNUAL REVIEW OF HM ORDERS  2024    BMP  2024    LIPID  2024    COVID-19 Vaccine ( season) 2024    HEMOGLOBIN  2024    ADVANCE CARE PLANNING  2025    MEDICARE ANNUAL WELLNESS VISIT  2025    FALL RISK ASSESSMENT  2025    DTAP/TDAP/TD  IMMUNIZATION (2 - Td or Tdap) 01/11/2033    PHQ-2 (once per calendar year)  Completed    INFLUENZA VACCINE  Completed    Pneumococcal Vaccine: 65+ Years  Completed    ZOSTER IMMUNIZATION  Completed    IPV IMMUNIZATION  Aged Out    HPV IMMUNIZATION  Aged Out    MENINGITIS IMMUNIZATION  Aged Out    RSV MONOCLONAL ANTIBODY  Aged Out    DEXA  Discontinued         Review of Systems  CONSTITUTIONAL: NEGATIVE for fever, chills, change in weight  INTEGUMENTARY/SKIN: NEGATIVE for worrisome rashes, moles or lesions  EYES: NEGATIVE for vision changes or irritation  ENT/MOUTH: NEGATIVE for ear, mouth and throat problems  RESP: NEGATIVE for significant cough or SOB  CV: NEGATIVE for chest pain, palpitations or peripheral edema  GI: NEGATIVE for nausea, abdominal pain, heartburn, or change in bowel habits  : NEGATIVE for frequency, dysuria, or hematuria  MUSCULOSKELETAL: NEGATIVE for significant arthralgias or myalgia  NEURO: NEGATIVE for weakness, dizziness or paresthesias  ENDOCRINE: NEGATIVE for temperature intolerance, skin/hair changes  HEME: NEGATIVE for bleeding problems  PSYCHIATRIC: NEGATIVE for changes in mood or affect    Current Outpatient Medications   Medication Sig Dispense Refill    amLODIPine (NORVASC) 10 MG tablet Take 1 tablet (10 mg) by mouth daily 90 tablet 0    ARB NOT PRESCRIBED, INTENTIONAL, by Other route continuous prn.  0    fish oil-omega-3 fatty acids 1000 MG capsule Take 2 capsules by mouth daily. 180 capsule 12    latanoprost (XALATAN) 0.005 % ophthalmic solution INSTILL 1 DROP IN BOTH EYES EVERY NIGHT      Multiple Vitamins-Minerals (PRESERVISION/LUTEIN) CAPS Take 1 Can by mouth 2 times daily.      simvastatin (ZOCOR) 40 MG tablet Take 1 tablet (40 mg) by mouth at bedtime 90 tablet 0    timolol maleate (TIMOPTIC) 0.5 % ophthalmic solution INSTILL 1 DROP IN BOTH EYES TWICE DAILY       No current facility-administered medications for this visit.          Objective    Exam  /76   Pulse 68    "Temp 97.5  F (36.4  C) (Temporal)   Ht 1.537 m (5' 0.5\")   Wt 69.4 kg (153 lb)   SpO2 96%   BMI 29.39 kg/m     Estimated body mass index is 29.39 kg/m  as calculated from the following:    Height as of this encounter: 1.537 m (5' 0.5\").    Weight as of this encounter: 69.4 kg (153 lb).    Physical Exam  GENERAL: alert and no distress  EYES: Eyes grossly normal to inspection, PERRL and conjunctivae and sclerae normal  HENT: ear canals and TM's normal, nose and mouth without ulcers or lesions  NECK: no adenopathy, no asymmetry, masses, or scars  RESP: lungs clear to auscultation - no rales, rhonchi or wheezes  CV: regular rate and rhythm, normal S1 S2, no S3 or S4, no murmur, click or rub, no peripheral edema  ABDOMEN: soft, nontender, no hepatosplenomegaly, no masses and bowel sounds normal  NEURO: Normal strength and tone, mentation intact and speech normal  PSYCH: mentation appears normal, affect normal/bright         6/3/2024   Mini Cog   Clock Draw Score 0 Abnormal   3 Item Recall 1 object recalled   Mini Cog Total Score 1              Signed Electronically by: Vince Beckett MD    "

## 2024-06-04 PROCEDURE — 82570 ASSAY OF URINE CREATININE: CPT | Performed by: INTERNAL MEDICINE

## 2024-06-04 PROCEDURE — 82043 UR ALBUMIN QUANTITATIVE: CPT | Performed by: INTERNAL MEDICINE

## 2024-06-05 LAB
CREAT UR-MCNC: 44.9 MG/DL
MICROALBUMIN UR-MCNC: <12 MG/L
MICROALBUMIN/CREAT UR: NORMAL MG/G{CREAT}

## 2025-06-30 DIAGNOSIS — I10 ESSENTIAL HYPERTENSION WITH GOAL BLOOD PRESSURE LESS THAN 140/90: ICD-10-CM

## 2025-06-30 DIAGNOSIS — E78.5 HYPERLIPIDEMIA WITH TARGET LDL LESS THAN 100: ICD-10-CM

## 2025-06-30 RX ORDER — AMLODIPINE BESYLATE 10 MG/1
10 TABLET ORAL DAILY
Qty: 90 TABLET | Refills: 3 | Status: SHIPPED | OUTPATIENT
Start: 2025-06-30

## 2025-06-30 RX ORDER — SIMVASTATIN 40 MG
40 TABLET ORAL AT BEDTIME
Qty: 90 TABLET | Refills: 3 | Status: SHIPPED | OUTPATIENT
Start: 2025-06-30

## 2025-06-30 NOTE — TELEPHONE ENCOUNTER
Patient has appointment scheduled for 9/9/25.  Only has a few days left of both medications and would like refill to get her to 9/9.